# Patient Record
Sex: MALE | Race: OTHER | Employment: UNEMPLOYED | URBAN - METROPOLITAN AREA
[De-identification: names, ages, dates, MRNs, and addresses within clinical notes are randomized per-mention and may not be internally consistent; named-entity substitution may affect disease eponyms.]

---

## 2017-08-13 ENCOUNTER — APPOINTMENT (OUTPATIENT)
Dept: GENERAL RADIOLOGY | Age: 47
End: 2017-08-13
Attending: PHYSICIAN ASSISTANT
Payer: COMMERCIAL

## 2017-08-13 ENCOUNTER — HOSPITAL ENCOUNTER (OUTPATIENT)
Age: 47
Discharge: HOME | End: 2017-08-13
Payer: COMMERCIAL

## 2017-08-13 VITALS
OXYGEN SATURATION: 97 % | SYSTOLIC BLOOD PRESSURE: 124 MMHG | RESPIRATION RATE: 18 BRPM | HEART RATE: 64 BPM | DIASTOLIC BLOOD PRESSURE: 72 MMHG | HEIGHT: 72 IN | BODY MASS INDEX: 39.42 KG/M2 | TEMPERATURE: 99 F | WEIGHT: 291 LBS

## 2017-08-13 DIAGNOSIS — S39.012A LOW BACK STRAIN, INITIAL ENCOUNTER: Primary | ICD-10-CM

## 2017-08-13 PROCEDURE — 99203 OFFICE O/P NEW LOW 30 MIN: CPT

## 2017-08-13 PROCEDURE — 72110 X-RAY EXAM L-2 SPINE 4/>VWS: CPT | Performed by: PHYSICIAN ASSISTANT

## 2017-08-13 PROCEDURE — 99204 OFFICE O/P NEW MOD 45 MIN: CPT

## 2017-08-13 RX ORDER — CYCLOBENZAPRINE HCL 10 MG
10 TABLET ORAL 3 TIMES DAILY PRN
Qty: 20 TABLET | Refills: 0 | Status: SHIPPED | OUTPATIENT
Start: 2017-08-13 | End: 2017-08-20

## 2017-08-13 RX ORDER — IBUPROFEN 600 MG/1
600 TABLET ORAL EVERY 8 HOURS PRN
Qty: 30 TABLET | Refills: 0 | Status: SHIPPED | OUTPATIENT
Start: 2017-08-13 | End: 2017-08-20
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
## 2024-12-27 ENCOUNTER — APPOINTMENT (OUTPATIENT)
Dept: GENERAL RADIOLOGY | Age: 54
End: 2024-12-27
Attending: PHYSICIAN ASSISTANT
Payer: COMMERCIAL

## 2024-12-27 ENCOUNTER — HOSPITAL ENCOUNTER (OUTPATIENT)
Age: 54
Discharge: HOME | End: 2024-12-27
Payer: COMMERCIAL

## 2024-12-27 VITALS
RESPIRATION RATE: 18 BRPM | HEART RATE: 72 BPM | TEMPERATURE: 98 F | OXYGEN SATURATION: 96 % | SYSTOLIC BLOOD PRESSURE: 133 MMHG | DIASTOLIC BLOOD PRESSURE: 76 MMHG

## 2024-12-27 DIAGNOSIS — Z48.02 ENCOUNTER FOR REMOVAL OF SUTURES: ICD-10-CM

## 2024-12-27 DIAGNOSIS — S22.32XA CLOSED FRACTURE OF ONE RIB OF LEFT SIDE, INITIAL ENCOUNTER: Primary | ICD-10-CM

## 2024-12-27 PROCEDURE — 71101 X-RAY EXAM UNILAT RIBS/CHEST: CPT | Performed by: PHYSICIAN ASSISTANT

## 2024-12-27 PROCEDURE — 99204 OFFICE O/P NEW MOD 45 MIN: CPT

## 2024-12-27 PROCEDURE — 99203 OFFICE O/P NEW LOW 30 MIN: CPT

## 2024-12-27 NOTE — DISCHARGE INSTRUCTIONS
If any fevers chills or worsening symptoms you need to be reevaluated use incentive spirometer as directed    If any shortness of breath or worsening chest pain be reevaluated

## 2024-12-27 NOTE — ED PROVIDER NOTES
Patient Seen in: [facility]      History     Chief Complaint   Patient presents with    Suture Removal     Stated Complaint: suture removal    Subjective:   HPI      This is a 54-year-old male with no known past medical history who presented on 12/21 to the emergency department after a syncopal episode had 3 stitches placed in his left eyebrow.Denies any complications.  Patient does report though that he did not notice it while he was in the emergency department but after leaving started having left lower rib cage pain that hurts with palpation and movement.     Objective:     History reviewed. No pertinent past medical history.           History reviewed. No pertinent surgical history.             Social History     Socioeconomic History    Marital status: Single   Tobacco Use    Smoking status: Never    Smokeless tobacco: Never     Social Drivers of Health      Received from [facility]    [facility]              Review of Systems    Positive for stated complaint: suture removal  Other systems are as noted in HPI.  Constitutional and vital signs reviewed.      All other systems reviewed and negative except as noted above.    Physical Exam     ED Triage Vitals [12/27/24 0817]   /76   Pulse 72   Resp 18   Temp 98.2 °F (36.8 °C)   Temp src Oral   SpO2 96 %   O2 Device None (Room air)       Current Vitals:   Vital Signs  BP: 133/76  Pulse: 72  Resp: 18  Temp: 98.2 °F (36.8 °C)  Temp src: Oral    Oxygen Therapy  SpO2: 96 %  O2 Device: None (Room air)        Physical Exam  Vitals and nursing note reviewed.   Constitutional:       General: He is not in acute distress.     Appearance: Normal appearance. He is well-developed. He is not diaphoretic.   HENT:      Head: Normocephalic.      Comments: Scabbed lesion to left eyebrow   Cardiovascular:      Rate and Rhythm: Normal rate and regular rhythm.   Pulmonary:      Effort: Pulmonary effort is normal. No respiratory distress.      Breath sounds:  Normal breath sounds.   Chest:      Chest wall: Tenderness (left lateal posterior lower chest wall) present. No deformity, swelling or crepitus.   Musculoskeletal:      Cervical back: Normal range of motion.   Skin:     General: Skin is warm and dry.      Capillary Refill: Capillary refill takes less than 2 seconds.      Coloration: Skin is not pale.      Findings: No erythema or rash.   Neurological:      Mental Status: He is alert and oriented to person, place, and time.      Motor: No abnormal muscle tone.      Coordination: Coordination normal.      Deep Tendon Reflexes: Reflexes are normal and symmetric.   Psychiatric:         Behavior: Behavior normal.         Thought Content: Thought content normal.         Judgment: Judgment normal.         ED Course   Labs Reviewed - No data to display     XR RIBS WITH CHEST (3 VIEWS), LEFT  (CPT=71101)    Result Date: 12/27/2024  PROCEDURE:  XR RIBS WITH CHEST (3 VIEWS), LEFT  (CPT=71101)  TECHNIQUE:  PA Chest and three views of the ribs were obtained  COMPARISON:  None.  INDICATIONS:  Left rib pain  PATIENT STATED HISTORY: (As transcribed by Technologist)  Left lower back ribs pain post fall x1 week.    FINDINGS:  Normal heart size and pulmonary vascularity. No pleural effusion or pneumothorax. No lobar consolidation.  Minimally displaced and angulated fracture of the lateral left 10th rib.            CONCLUSION:  Minimally displaced and angulated fracture of the lateral left 10th rib.  No pneumothorax.   LOCATION:  [facility]     Dictated by (CST): Sebastian Magallanes MD on 12/27/2024 at 9:09 AM     Finalized by (CST): Sebastian Magallanes MD on 12/27/2024 at 9:10 AM           [facility]          Medical Decision Making  54-year-old male who comes in today complaining of left-sided rib pain after a fall that occurred on 1221.  He did not feel the pain when he was in the emergency room initially for syncopal event after and is concerned as he still having some pain there.  Denies shortness  of breath fevers or chills.  Patient is also here for suture removal    Problems Addressed:  Encounter for removal of sutures: acute illness or injury    Amount and/or Complexity of Data Reviewed  Radiology: ordered and independent interpretation performed. Decision-making details documented in ED Course.     Details: I personally reviewed the patients chest x-ray no evidence of pneumothorax but patient does have what appears to be a minimally displaced fracture of the left 10th rib    ECG/medicine tests:      Details: Incentive spirometer and training provided    Risk  OTC drugs.  Risk Details: Clinical Impression: Suture removal, rib fracture      The differential diagnosis before testing included rib fracture, rib contusion, pneumothorax , which is a medical condition that poses a threat to life/function.         SUTURE REMOVAL PROCEDURE:  PROCEDURE: Removal of previously placed sutures  LOCATION OF SUTURES: left eyebrow  SUTURES PREVIOUSLY PLACED AT:  Another Facility on 12/21      The wound demonstrates no evidence of infection with adequate tensile strength at the wound margins at this time to warrant suture removal. Sutures were removed individually using Littauer scissors and forceps, with a total of 3 sutures removed.    Re-examination of the wound following the procedure reveals no evidence of any retained foreign bodies and no dehiscence.   Wound care precautions were given to the patient verbally.        Disposition and Plan     Clinical Impression:  1. Closed fracture of one rib of left side, initial encounter    2. Encounter for removal of sutures         Disposition:  Discharge  12/27/2024  9:24 am    Follow-up:  Williams Pabon MD  [address] [place] [id number]  [phone]                Medications Prescribed:  Discharge Medication List as of 12/27/2024  9:25 AM              Supplementary Documentation: